# Patient Record
Sex: FEMALE | Race: WHITE | NOT HISPANIC OR LATINO | URBAN - METROPOLITAN AREA
[De-identification: names, ages, dates, MRNs, and addresses within clinical notes are randomized per-mention and may not be internally consistent; named-entity substitution may affect disease eponyms.]

---

## 2017-01-12 ENCOUNTER — HOSPITAL ENCOUNTER (OUTPATIENT)
Dept: MAMMOGRAPHY | Facility: MEDICAL CENTER | Age: 57
Discharge: HOME | End: 2017-01-12
Payer: COMMERCIAL

## 2017-01-12 DIAGNOSIS — Z12.31 ENCOUNTER FOR SCREENING MAMMOGRAM FOR MALIGNANT NEOPLASM OF BREAST: ICD-10-CM

## 2017-01-12 PROCEDURE — 77063 BREAST TOMOSYNTHESIS BI: CPT

## 2017-01-12 PROCEDURE — G0202 SCR MAMMO BI INCL CAD: HCPCS

## 2017-10-12 ENCOUNTER — ALLSCRIPTS OFFICE VISIT (OUTPATIENT)
Dept: OTHER | Facility: OTHER | Age: 57
End: 2017-10-12

## 2017-10-12 DIAGNOSIS — Z12.31 ENCOUNTER FOR SCREENING MAMMOGRAM FOR MALIGNANT NEOPLASM OF BREAST: ICD-10-CM

## 2018-01-15 NOTE — CONSULTS
Chief Complaint  The patient presents to the office for her routine exam       History of Present Illness  HPI: here for yearly gyn exam; beginning to skip menses; none since March 2017 and having periodic hot flashes, not requiring medication   GYN HM, Adult Female [facility]: The patient is being seen for a health maintenance evaluation  The last health maintenance visit was 12 month(s) ago  General Health: The patient's health since the last visit is described as good  She has regular dental visits  She denies vision problems  She denies hearing loss  Immunizations status: up to date  Lifestyle:  She consumes a diverse and healthy diet  She does not have any weight concerns  She exercises regularly  She does not use tobacco  She denies alcohol use  She denies drug use  Reproductive health:  she reports no menstrual problems  Screening: cancer screening reviewed and current  metabolic screening reviewed and current  risk screening reviewed and current  Review of Systems    Constitutional: No fever, no chills, feels well, no tiredness, no recent weight gain or loss  ENT: no ear ache, no loss of hearing, no nosebleeds or nasal discharge, no sore throat or hoarseness  Cardiovascular: no complaints of slow or fast heart rate, no chest pain, no palpitations, no leg claudication or lower extremity edema  Respiratory: no complaints of shortness of breath, no wheezing, no dyspnea on exertion, no orthopnea or PND  Breasts: no complaints of breast pain, breast lump or nipple discharge  Gastrointestinal: no complaints of abdominal pain, no constipation, no nausea or diarrhea, no vomiting, no bloody stools  Genitourinary: no complaints of dysuria, no incontinence, no pelvic pain, no dysmenorrhea, no vaginal discharge or abnormal vaginal bleeding  Musculoskeletal: no complaints of arthralgia, no myalgia, no joint swelling or stiffness, no limb pain or swelling     Integumentary: no complaints of skin rash or lesion, no itching or dry skin, no skin wounds  Neurological: no complaints of headache, no confusion, no numbness or tingling, no dizziness or fainting  Active Problems    1  Anemia (285 9) (D64 9)   2  Dense breasts (793 82) (R92 2)   3  Encounter for routine gynecological examination (V72 31)   4  Encounter for routine gynecological examination with Papanicolaou smear of cervix   (V72 31,V76 2) (Z01 419)   5  Encounter for screening mammogram for malignant neoplasm of breast (V76 12)   (Z12 31)   6  Irregular menstrual cycle (626 4) (N92 6)   7  Mammogram abnormal (793 80) (R92 8)   8  Menopausal disorder (627 9) (N95 9)   9  Madie-menopause (627 2) (N95 1)   10  Screening for HPV (human papillomavirus) (V73 81) (Z11 51)    Surgical History    · History of Complete Colonoscopy   · History of Tubal Ligation    Family History    · Family history of colon cancer (V16 0) (Z80 0)    Social History    · Being A Social Drinker   · Never A Smoker    Current Meds   1  Benazepril HCl - 10 MG Oral Tablet; Therapy: [id number] to (Last Rx:[id number])  Requested for: 61SAE4456 Ordered   2  ZyrTEC Allergy TABS Recorded    Allergies    1  Amoxicillin CAPS   2  Ampicillin CAPS   3  Levaquin TABS   4  Penicillins   5  Sulfa Antibiotics    Vitals   Recorded: 86ADC2139 14:25ET   Systolic 209   Diastolic 74   Height 5 ft 1 in   Weight 128 lb    BMI Calculated 24 19   BSA Calculated 1 56   LMP [id number]     Physical Exam    Constitutional   General appearance: No acute distress, well appearing and well nourished  Neck   Neck: Normal, supple, trachea midline, no masses  Thyroid: Normal, no thyromegaly  Pulmonary   Respiratory effort: No increased work of breathing or signs of respiratory distress  Auscultation of lungs: Clear to auscultation  Cardiovascular   Auscultation of heart: Normal rate and rhythm, normal S1 and S2, no murmurs  Peripheral vascular exam: Normal pulses Throughout  Genitourinary   External genitalia: Normal and no lesions appreciated  Vagina: Normal, no lesions or dryness appreciated  Urethra: Normal     Urethral meatus: Normal     Bladder: Normal, soft, non-tender and no prolapse or masses appreciated  Cervix: Normal, no palpable masses  Uterus: Normal, non-tender, not enlarged, and no palpable masses  Adnexa/parametria: Normal, non-tender and no fullness or masses appreciated  Anus, perineum, and rectum: Normal sphincter tone, no masses, and no prolapse  Chest   Breasts: Normal and no dimpling or skin changes noted  densie bilaterally  Abdomen   Abdomen: Normal, non-tender, and no organomegaly noted  Liver and spleen: No hepatomegaly or splenomegaly  Examination for hernias: No hernias appreciated  Stool sample for occult blood: Negative  Lymphatic   Palpation of lymph nodes in neck, axillae, groin and/or other locations: No lymphadenopathy or masses noted  Skin   Skin and subcutaneous tissue: Normal skin turgor and no rashes  Palpation of skin and subcutaneous tissue: Normal     Psychiatric   Orientation to person, place, and time: Normal     Mood and affect: Normal        Assessment    1  Dense breasts (793 82) (R92 2)   2  Madie-menopause (627 2) (N95 1)   3  Encounter for routine gynecological examination (V72 31)    Plan  Encounter for routine gynecological examination    · Follow-up visit in 1 year Evaluation and Treatment  Follow-up  Status: Hold For -  Scheduling  Requested for: 77GLD6288   Ordered; For: Encounter for routine gynecological examination; Ordered By: Fay Avila Performed:  Due: 62JNE0202  Encounter for screening mammogram for malignant neoplasm of breast    · * MAMMO SCREENING BILATERAL W CAD; Status:Hold For - Scheduling,Retrospective  By Protocol Authorization; Requested for:[id number];    Perform:[facility]; [id number];  Last Updated By:Evan Valle; 10/12/2017 11:42:13 AM;Ordered; For:Encounter for screening mammogram for malignant neoplasm of breast; Ordered By:Shayla Chatman;    Discussion/Summary    Reviewed potential gyn emergencies Rx given for next 3-D screening mammogram; RTO 1 year or prn; if meds needed for hot flashes, to call; seen with p yariel  student, Dutch Solomon        Signatures   Electronically signed by : Cynthia Wihte DO; Oct 12 2017 12:36PM EST                       (Author)

## 2018-01-15 NOTE — CONSULTS
Chief Complaint  The patient presents for her routine exam       History of Present Illness  HPI: here for yearl, some hot flashes; last men ses March, 2017y preventive       GYN HM, Adult Female [facility]: The patient is being seen for a health maintenance evaluation  The last health maintenance visit was 12 month(s) ago  General Health: The patient's health since the last visit is described as good  She has regular dental visits  She denies vision problems  She denies hearing loss  Immunizations status: up to date  Lifestyle:  She consumes a diverse and healthy diet  She does not have any weight concerns  She exercises regularly  She does not use tobacco  She denies alcohol use  She denies drug use  Reproductive health:  she reports no menstrual problems  Screening: cancer screening reviewed and current  metabolic screening reviewed and current  risk screening reviewed and current  Review of Systems    Constitutional: No fever, no chills, feels well, no tiredness, no recent weight gain or loss  ENT: no ear ache, no loss of hearing, no nosebleeds or nasal discharge, no sore throat or hoarseness  Cardiovascular: no complaints of slow or fast heart rate, no chest pain, no palpitations, no leg claudication or lower extremity edema  Respiratory: no complaints of shortness of breath, no wheezing, no dyspnea on exertion, no orthopnea or PND  Breasts: no complaints of breast pain, breast lump or nipple discharge  Gastrointestinal: no complaints of abdominal pain, no constipation, no nausea or diarrhea, no vomiting, no bloody stools  Genitourinary: as noted in HPI  Musculoskeletal: no complaints of arthralgia, no myalgia, no joint swelling or stiffness, no limb pain or swelling  Integumentary: no complaints of skin rash or lesion, no itching or dry skin, no skin wounds  Neurological: no complaints of headache, no confusion, no numbness or tingling, no dizziness or fainting        Active Problems    1  Anemia (285 9) (D64 9)   2  Dense breasts (793 82) (R92 2)   3  Encounter for routine gynecological examination (V72 31)   4  Encounter for routine gynecological examination with Papanicolaou smear of cervix   (V72 31,V76 2) (Z01 419)   5  Encounter for screening mammogram for malignant neoplasm of breast (V76 12)   (Z12 31)   6  Irregular menstrual cycle (626 4) (N92 6)   7  Mammogram abnormal (793 80) (R92 8)   8  Menopausal disorder (627 9) (N95 9)   9  Madie-menopause (627 2) (N95 1)   10  Screening for HPV (human papillomavirus) (V73 81) (Z11 51)    Surgical History    · History of Complete Colonoscopy   · History of Tubal Ligation    Family History    · Family history of colon cancer (V16 0) (Z80 0)    Social History    · Being A Social Drinker   · Never A Smoker    Current Meds   1  Benazepril HCl - 10 MG Oral Tablet; Therapy: 08GLI6610 to (Last Rx:59Vmj2079)  Requested for: 38PKQ1775 Ordered   2  ZyrTEC Allergy TABS Recorded    Allergies    1  Amoxicillin CAPS   2  Ampicillin CAPS   3  Levaquin TABS   4  Penicillins   5  Sulfa Antibiotics    Vitals   Recorded: 04VOY9533 71:29NM   Systolic 282   Diastolic 74   Height 5 ft 1 in   Weight 128 lb    BMI Calculated 24 19   BSA Calculated 1 56   LMP [id number]     Physical Exam    Constitutional   General appearance: No acute distress, well appearing and well nourished  Neck   Neck: Normal, supple, trachea midline, no masses  Thyroid: Normal, no thyromegaly  Pulmonary   Respiratory effort: No increased work of breathing or signs of respiratory distress  Auscultation of lungs: Clear to auscultation  Cardiovascular   Auscultation of heart: Normal rate and rhythm, normal S1 and S2, no murmurs  Peripheral vascular exam: Normal pulses Throughout  Genitourinary   External genitalia: Normal and no lesions appreciated  Vagina: Normal, no lesions or dryness appreciated      Urethra: Normal     Urethral meatus: Normal     Bladder: Normal, soft, non-tender and no prolapse or masses appreciated  Cervix: Normal, no palpable masses  Uterus: Normal, non-tender, not enlarged, and no palpable masses  Adnexa/parametria: Normal, non-tender and no fullness or masses appreciated  Anus, perineum, and rectum: Normal sphincter tone, no masses, and no prolapse  Chest   Breasts: Normal and no dimpling or skin changes noted  Abdomen   Abdomen: Normal, non-tender, and no organomegaly noted  Liver and spleen: No hepatomegaly or splenomegaly  Examination for hernias: No hernias appreciated  Stool sample for occult blood: Negative  Lymphatic   Palpation of lymph nodes in neck, axillae, groin and/or other locations: No lymphadenopathy or masses noted  Skin   Skin and subcutaneous tissue: Normal skin turgor and no rashes  Palpation of skin and subcutaneous tissue: Normal     Psychiatric   Orientation to person, place, and time: Normal     Mood and affect: Normal        Assessment    1  Dense breasts (793 82) (R92 2)   2  Madie-menopause (627 2) (N95 1)   3  Encounter for routine gynecological examination (V72 31)    Plan  Encounter for routine gynecological examination    · Follow-up visit in 1 year Evaluation and Treatment  Follow-up  Status: Hold For -  Scheduling  Requested for: 30IMD4240   Ordered; For: Encounter for routine gynecological examination; Ordered By: Silva Mendoza Performed:  Due: 26PKZ5234  Encounter for screening mammogram for malignant neoplasm of breast    · * MAMMO SCREENING BILATERAL W CAD; Status:Hold For - Scheduling,Retrospective  By Protocol Authorization; Requested for:12Oct2017;    Perform:[facility]; [id number]; Last Updated By:Mariah Valle; 10/12/2017 11:42:13 AM;Ordered;  For:Encounter for screening mammogram for malignant neoplasm of breast; Ordered By:Camilo Chatman;    Discussion/Summary    Reviewed potential gyn emergencies;  Rx for next screening mammogram; if meds, needed, to call; not due for pap and HPV until 2019        Future Appointments    Signatures   Electronically signed by : Collette Life, DO; Oct 12 2017 12:49PM EST                       (Author)

## 2018-01-22 ENCOUNTER — HOSPITAL ENCOUNTER (OUTPATIENT)
Dept: MAMMOGRAPHY | Facility: MEDICAL CENTER | Age: 58
Discharge: HOME | End: 2018-01-22
Payer: COMMERCIAL

## 2018-01-22 VITALS
SYSTOLIC BLOOD PRESSURE: 122 MMHG | WEIGHT: 128 LBS | HEIGHT: 61 IN | DIASTOLIC BLOOD PRESSURE: 74 MMHG | BODY MASS INDEX: 24.17 KG/M2

## 2018-01-22 DIAGNOSIS — Z12.31 ENCOUNTER FOR SCREENING MAMMOGRAM FOR MALIGNANT NEOPLASM OF BREAST: ICD-10-CM

## 2018-01-22 PROCEDURE — 77067 SCR MAMMO BI INCL CAD: CPT

## 2018-01-22 PROCEDURE — 77063 BREAST TOMOSYNTHESIS BI: CPT

## 2018-10-24 ENCOUNTER — ANNUAL EXAM (OUTPATIENT)
Dept: OBGYN CLINIC | Facility: MEDICAL CENTER | Age: 58
End: 2018-10-24
Payer: COMMERCIAL

## 2018-10-24 VITALS
HEIGHT: 62 IN | DIASTOLIC BLOOD PRESSURE: 80 MMHG | SYSTOLIC BLOOD PRESSURE: 140 MMHG | BODY MASS INDEX: 23.26 KG/M2 | WEIGHT: 126.4 LBS

## 2018-10-24 DIAGNOSIS — Z01.419 ENCNTR FOR GYN EXAM (GENERAL) (ROUTINE) W/O ABN FINDINGS: Primary | ICD-10-CM

## 2018-10-24 DIAGNOSIS — Z12.11 ENCOUNTER FOR SCREENING COLONOSCOPY: ICD-10-CM

## 2018-10-24 DIAGNOSIS — Z80.0 FAMILY HISTORY OF COLON CANCER IN FATHER: ICD-10-CM

## 2018-10-24 DIAGNOSIS — Z12.31 ENCOUNTER FOR SCREENING MAMMOGRAM FOR MALIGNANT NEOPLASM OF BREAST: ICD-10-CM

## 2018-10-24 PROCEDURE — S0612 ANNUAL GYNECOLOGICAL EXAMINA: HCPCS | Performed by: OBSTETRICS & GYNECOLOGY

## 2018-10-24 RX ORDER — CETIRIZINE HYDROCHLORIDE 10 MG/1
TABLET ORAL
COMMUNITY

## 2018-10-24 RX ORDER — BENAZEPRIL HYDROCHLORIDE 10 MG/1
TABLET ORAL
COMMUNITY
Start: 2011-05-16 | End: 2020-09-24

## 2018-10-24 RX ORDER — OMEGA-3-ACID ETHYL ESTERS 1 G/1
2 CAPSULE, LIQUID FILLED ORAL 2 TIMES DAILY
COMMUNITY

## 2018-10-24 RX ORDER — MELATONIN
1000 DAILY
COMMUNITY

## 2018-10-24 NOTE — PROGRESS NOTES
ASSESSMENT & PLAN: Hay Gann was seen today for gynecologic exam     Diagnoses and all orders for this visit:    Encntr for gyn exam (general) (routine) w/o abn findings    Encounter for screening mammogram for malignant neoplasm of breast  -     Mammo screening bilateral w 3d & cad; Future    Encounter for screening colonoscopy  -     Ambulatory referral to Gastroenterology; Future    Family history of colon cancer in father  -     Ambulatory referral to Gastroenterology; Future    Discussion/Summary:   Pt  Here for yearly gyn preventive;msincelast visit, pt  Has had no menses since late 2017   1  Routine well woman exam done today  2  Pap and HPV:  The patient's pap is up to date  Pap and cotesting was not done today  Current ASCCP Guidelines reviewed  3   Mammogram was ordered  4  Colonoscopy is not up to date  4  The following were reviewed in today's visit: colonoscopy discussed and ordered  5  F/u 1 year      CC:  Annual Gynecologic Examination    HPI: Rome Bosworth is a 60 y o  who presents for annual gynecologic examination  She has the following concerns:  none    Health Maintenance:    Patient describes her health as good  Patient does not have weight concerns  She exercises 7 days per week with work and walking  She doeswears her seatbelt routinely  She does perform regular monthly self breast exams  She does feel safe at home  Patients does not follow a  diet  Patient gets 4 servings of dairy or calcium rich foods a day  Last pap: 2014  Last mammogram: 2016  Last colonoscopy: 2014    Patient Active Problem List   Diagnosis    Dense breasts    Menopausal disorder    Madie-menopause       No past medical history on file  No past surgical history on file  Past OB/Gyn History:    History of abnormal pap smears: No    Patient is currently sexually active    Patient's family planning method is post menopausal status  No family history on file      Social History:  Social History     Social History    Marital status: /Civil Union     Spouse name: N/A    Number of children: N/A    Years of education: N/A     Occupational History    Not on file  Social History Main Topics    Smoking status: Never Smoker    Smokeless tobacco: Never Used    Alcohol use Yes      Comment: occ   Drug use: No    Sexual activity: Yes     Partners: Male     Birth control/ protection: Female Sterilization     Other Topics Concern    Not on file     Social History Narrative    No narrative on file     Presently lives with family  Patient is currently employed   Allergies   Allergen Reactions    Amoxicillin     Ampicillin     Levofloxacin     Penicillins     Sulfa Antibiotics          Current Outpatient Prescriptions:     b complex vitamins tablet, Take 1 tablet by mouth daily, Disp: , Rfl:     benazepril (LOTENSIN) 10 mg tablet, Take by mouth, Disp: , Rfl:     cetirizine (ZYRTEC ALLERGY) 10 mg tablet, Take by mouth, Disp: , Rfl:     cholecalciferol (VITAMIN D3) 1,000 units tablet, Take 1,000 Units by mouth daily, Disp: , Rfl:     omega-3-acid ethyl esters (LOVAZA) 1 g capsule, Take 2 g by mouth 2 (two) times a day, Disp: , Rfl:     Review of Systems  Constitutional :no fever, feels well, no tiredness, no recent weight gain or loss  ENT: no ear ache, no loss of hearing, no nosebleeds or nasal discharge, no sore throat or hoarseness  Cardiovascular: no complaints of slow or fast heart beat, no chest pain, no palpitations, no leg claudication or lower extremity edema    Respiratory: no complaints of shortness of shortness of breath, no JONES  Breasts:no complaints of breast pain, breast lump, or nipple discharge  Gastrointestinal: no complaints of abdominal pain, constipation, nausea, vomiting, or diarrhea or bloody stools  Genitourinary : no complaints of dysuria, incontinence, pelvic pain, no dysmenorrhea, vaginal discharge or abnormal vaginal bleeding and as noted in HPI   Musculoskeletal: no complaints of arthralgia, no myalgia, no joint swelling or stiffness, no limb pain or swelling  Integumentary: no complaints of skin rash or lesion, itching or dry skin  Neurological: no complaints of headache, no confusion, no numbness or tingling, no dizziness or fainting    Physical Exam:     /80   Ht 5' 1 5" (1 562 m)   Wt 57 3 kg (126 lb 6 4 oz)   Breastfeeding? No   BMI 23 50 kg/m²     General: appears stated age, cooperative, alert normal mood and affect   Psychiatric oriented to person, place and time  Mood and affect normal   Neck: normal, supple,trachea midline, no masses  Thyroid: normal, no thyromegaly   Heart: regular rate and rhythm, S1, S2 normal, no murmur, click, rub or gallop   Lungs: clear to auscultation bilaterally, no increased work of breathing or signs of respiratory distress   Breasts: normal, no dimpling or skin changes noted   Abdomen: soft, non-tender, without masses or organomegaly   Vulva: normal , no lesions   Vagina: normal , no lesions or dryness   Urethra: normal   Urethal meatus normal   Bladder Normal, soft, non-tender and no prolapse or masses appreciated   Cervix: normal, no palpable masses    Uterus: normal , non-tender, not enlarged, no palpable masses   Adnexa: normal, non-tender without fullness or masses; hemoccult neg  Lymphatic Palpation of lymph nodes in neck, axilla, groin and/or other locations: no lymphadenopathy or masses noted   Skin Normal skin turgor and no rashes    Palpation of skin and subcutaneous tissue normal

## 2018-10-24 NOTE — PATIENT INSTRUCTIONS
Pt  Seen with eva Gardner a  Student, Dutch; pt   Had worked for [employer] for 44 years, now with [employer]

## 2019-01-23 ENCOUNTER — HOSPITAL ENCOUNTER (OUTPATIENT)
Dept: MAMMOGRAPHY | Facility: MEDICAL CENTER | Age: 59
Discharge: HOME | End: 2019-01-23
Payer: COMMERCIAL

## 2019-01-23 VITALS — BODY MASS INDEX: 23.19 KG/M2 | HEIGHT: 62 IN | WEIGHT: 126 LBS

## 2019-01-23 DIAGNOSIS — Z12.31 ENCOUNTER FOR SCREENING MAMMOGRAM FOR MALIGNANT NEOPLASM OF BREAST: ICD-10-CM

## 2019-01-23 PROCEDURE — 77063 BREAST TOMOSYNTHESIS BI: CPT

## 2019-01-23 PROCEDURE — 77067 SCR MAMMO BI INCL CAD: CPT

## 2019-03-28 ENCOUNTER — TELEPHONE (OUTPATIENT)
Dept: GASTROENTEROLOGY | Facility: CLINIC | Age: 59
End: 2019-03-28

## 2020-01-02 ENCOUNTER — TRANSCRIBE ORDERS (OUTPATIENT)
Dept: ADMINISTRATIVE | Facility: HOSPITAL | Age: 60
End: 2020-01-02

## 2020-01-02 ENCOUNTER — TELEPHONE (OUTPATIENT)
Dept: OBGYN CLINIC | Facility: MEDICAL CENTER | Age: 60
End: 2020-01-02

## 2020-01-02 DIAGNOSIS — Z12.31 ENCOUNTER FOR SCREENING MAMMOGRAM FOR MALIGNANT NEOPLASM OF BREAST: Primary | ICD-10-CM

## 2020-02-21 ENCOUNTER — HOSPITAL ENCOUNTER (OUTPATIENT)
Dept: MAMMOGRAPHY | Facility: MEDICAL CENTER | Age: 60
Discharge: HOME | End: 2020-02-21
Payer: COMMERCIAL

## 2020-02-21 VITALS — BODY MASS INDEX: 23.19 KG/M2 | WEIGHT: 126 LBS | HEIGHT: 62 IN

## 2020-02-21 DIAGNOSIS — Z12.31 ENCOUNTER FOR SCREENING MAMMOGRAM FOR MALIGNANT NEOPLASM OF BREAST: ICD-10-CM

## 2020-02-21 PROCEDURE — 77067 SCR MAMMO BI INCL CAD: CPT

## 2020-02-21 PROCEDURE — 77063 BREAST TOMOSYNTHESIS BI: CPT

## 2020-09-08 ENCOUNTER — OFFICE VISIT (OUTPATIENT)
Dept: OBGYN CLINIC | Facility: MEDICAL CENTER | Age: 60
End: 2020-09-08
Payer: COMMERCIAL

## 2020-09-08 VITALS
DIASTOLIC BLOOD PRESSURE: 70 MMHG | SYSTOLIC BLOOD PRESSURE: 132 MMHG | TEMPERATURE: 98.1 F | BODY MASS INDEX: 23.03 KG/M2 | WEIGHT: 123.9 LBS

## 2020-09-08 DIAGNOSIS — R10.2 PELVIC PAIN: ICD-10-CM

## 2020-09-08 DIAGNOSIS — R10.2 SUPRAPUBIC PAIN: Primary | ICD-10-CM

## 2020-09-08 LAB
SL AMB  POCT GLUCOSE, UA: NORMAL
SL AMB LEUKOCYTE ESTERASE,UA: NORMAL
SL AMB POCT BILIRUBIN,UA: NORMAL
SL AMB POCT BLOOD,UA: NORMAL
SL AMB POCT CLARITY,UA: CLEAR
SL AMB POCT COLOR,UA: NORMAL
SL AMB POCT KETONES,UA: NORMAL
SL AMB POCT NITRITE,UA: NORMAL
SL AMB POCT PH,UA: 5
SL AMB POCT SPECIFIC GRAVITY,UA: 1.01
SL AMB POCT URINE PROTEIN: NORMAL
SL AMB POCT UROBILINOGEN: NORMAL

## 2020-09-08 PROCEDURE — 81002 URINALYSIS NONAUTO W/O SCOPE: CPT | Performed by: STUDENT IN AN ORGANIZED HEALTH CARE EDUCATION/TRAINING PROGRAM

## 2020-09-08 PROCEDURE — 99213 OFFICE O/P EST LOW 20 MIN: CPT | Performed by: STUDENT IN AN ORGANIZED HEALTH CARE EDUCATION/TRAINING PROGRAM

## 2020-09-08 NOTE — PROGRESS NOTES
[facility] [address] [place] [address]    Assessment/Plan:  Suprapubic pain  - Normal UA    Pelvic pain  - We discussed the possible etiologies of her pelvic pain including GI causes, musculoskeletal causes, ovarian causes such as simple or complex ovarian cyst, intermittent ovarian torsion, or GYN malignancy  - Recommended starting with Pelvic Ultrasound  - For pain, we discussed that it is safe to increase her ibuprofen use to 600 mg q 6 hours for the next 7 days, as long as she doesn't have any underlying kidney disease or peptic ulcer disease;     Diagnoses and all orders for this visit:    Suprapubic pain  -     Cancel: POCT urine dip  -     US pelvis complete non OB; Future    Pelvic pain  -     US pelvis complete non OB; Future  -     POCT urine dip          Subjective:   Phuc Fitzpatrick is a 63 y o   female  CC: Pelvic pain    HPI: Milana Dominguez presents with 4 days of acute onset left sided pelvic pain  It came on rather suddenly  The quality is throbbing  It is moderate in intensity, but not severe  It comes and goes  It is relieved by heating pad and ibuprofen  It is worsened by activity  It is not associated with nausea or vomiting  ROS: Review of Systems   Constitutional: Negative for appetite change, fatigue, fever and unexpected weight change  HENT: Negative for congestion and rhinorrhea  Eyes: Negative for visual disturbance  Respiratory: Negative for cough and shortness of breath  Cardiovascular: Negative for chest pain and palpitations  Gastrointestinal: Negative for abdominal distention, abdominal pain, blood in stool, constipation, diarrhea, nausea and vomiting  Endocrine: Negative for cold intolerance and heat intolerance  Genitourinary: Positive for pelvic pain  Negative for dysuria, flank pain, hematuria, urgency, vaginal bleeding and vaginal discharge  Musculoskeletal: Negative for back pain and myalgias     Skin: Negative for rash  Neurological: Negative for weakness and headaches  Hematological: Negative for adenopathy  Does not bruise/bleed easily  Psychiatric/Behavioral: Negative for dysphoric mood and suicidal ideas  The patient is not nervous/anxious  PFSH: The following portions of the patient's history were reviewed and updated as appropriate: allergies, current medications, past family history, past medical history, obstetric history, gynecologic history, past social history, past surgical history and problem list        Objective:  /70 (BP Location: Left arm, Patient Position: Sitting, Cuff Size: Standard)   Temp 98 1 °F (36 7 °C) (Temporal)   Wt 56 2 kg (123 lb 14 4 oz)   BMI 23 03 kg/m²    Physical Exam  Constitutional:       Appearance: Normal appearance  HENT:      Head: Normocephalic and atraumatic  Cardiovascular:      Rate and Rhythm: Normal rate  Pulmonary:      Effort: Pulmonary effort is normal    Abdominal:      General: There is no distension  Tenderness: There is no abdominal tenderness  There is no guarding  Genitourinary:     Exam position: Lithotomy position  Pubic Area: No rash  Labia:         Right: No rash, tenderness or lesion  Left: No rash, tenderness or lesion  Urethra: No prolapse, urethral swelling or urethral lesion  Vagina: No vaginal discharge, erythema, tenderness, bleeding or lesions  Cervix: No cervical motion tenderness, discharge, friability or erythema  Uterus: Not enlarged, not tender and no uterine prolapse  Adnexa:         Right: No mass, tenderness or fullness  Left: Fullness present  No mass or tenderness  Lymphadenopathy:      Lower Body: No right inguinal adenopathy  No left inguinal adenopathy  Neurological:      Mental Status: She is alert             Darnell Navarro MD  [address] [place] [address]  9/8/2020 4:26 PM

## 2020-09-08 NOTE — ASSESSMENT & PLAN NOTE
- We discussed the possible etiologies of her pelvic pain including GI causes, musculoskeletal causes, ovarian causes such as simple or complex ovarian cyst, intermittent ovarian torsion, or GYN malignancy  - Recommended starting with Pelvic Ultrasound     - For pain, we discussed that it is safe to increase her ibuprofen use to 600 mg q 6 hours for the next 7 days, as long as she doesn't have any underlying kidney disease or peptic ulcer disease;

## 2020-09-09 ENCOUNTER — HOSPITAL ENCOUNTER (OUTPATIENT)
Dept: ULTRASOUND IMAGING | Facility: MEDICAL CENTER | Age: 60
Discharge: HOME | End: 2020-09-09
Payer: COMMERCIAL

## 2020-09-09 DIAGNOSIS — R10.2 PELVIC PAIN: ICD-10-CM

## 2020-09-09 DIAGNOSIS — R10.2 SUPRAPUBIC PAIN: ICD-10-CM

## 2020-09-09 PROCEDURE — 76856 US EXAM PELVIC COMPLETE: CPT

## 2020-09-09 PROCEDURE — 76830 TRANSVAGINAL US NON-OB: CPT

## 2020-09-14 ENCOUNTER — TELEPHONE (OUTPATIENT)
Dept: OBGYN CLINIC | Facility: MEDICAL CENTER | Age: 60
End: 2020-09-14

## 2020-09-14 DIAGNOSIS — R09.89 SUSPECTED DEEP VEIN THROMBOSIS: Primary | ICD-10-CM

## 2020-09-14 DIAGNOSIS — R10.2 PELVIC PAIN: ICD-10-CM

## 2020-09-14 NOTE — TELEPHONE ENCOUNTER
TELEPHONE CALL      I called Darin Ricketts to discus the findings of her pelvic ultrasound (ordered for the indication of left adnexal pain)  I spoke to the radiologist (Dr Vega Bella) who indicated that the left ovarian vein appears dilated  Clinically, pelvic congestion syndrome seems rare in a postmenopausal patient  He suggested the next line of testing would be to further characterise the dilation and look for VTE  I discussed these findings with Darin Ricketts and she would like to proceed with the CT A/P with contrast   - Study ordered  - Prior authorization request sent    I told her to call our office in a few day to confirm the prior authorization      Yusuf Whitney MD  [address] [place] [address]  9/14/2020 5:27 PM

## 2020-09-18 ENCOUNTER — TELEPHONE (OUTPATIENT)
Dept: OBGYN CLINIC | Facility: MEDICAL CENTER | Age: 60
End: 2020-09-18

## 2020-09-18 NOTE — TELEPHONE ENCOUNTER
----- Message from Nadeen Chandra sent at 9/18/2020  2:23 PM EDT -----  I initiated PA for the CT scan through [facility]  Additional info is needed  I faxed clinicals to [phone]     Tracking# is [id number]    ----- Message -----  From: Nadeen Chandra  Sent: 9/18/2020   2:05 PM EDT  To: Eusebio Spear is scheduled for 9/24

## 2020-09-22 ENCOUNTER — TELEPHONE (OUTPATIENT)
Dept: OBGYN CLINIC | Facility: MEDICAL CENTER | Age: 60
End: 2020-09-22

## 2020-09-22 NOTE — TELEPHONE ENCOUNTER
Ct scan denied because they did not receive clinicals notes but I did faxed that to them on 9/18/20  Clinicals refaxed and marked as urgent

## 2020-09-24 ENCOUNTER — ANNUAL EXAM (OUTPATIENT)
Dept: OBGYN CLINIC | Facility: MEDICAL CENTER | Age: 60
End: 2020-09-24
Payer: COMMERCIAL

## 2020-09-24 ENCOUNTER — TELEPHONE (OUTPATIENT)
Dept: OBGYN CLINIC | Facility: MEDICAL CENTER | Age: 60
End: 2020-09-24

## 2020-09-24 VITALS
BODY MASS INDEX: 23.06 KG/M2 | TEMPERATURE: 97.3 F | HEIGHT: 62 IN | SYSTOLIC BLOOD PRESSURE: 118 MMHG | WEIGHT: 125.3 LBS | DIASTOLIC BLOOD PRESSURE: 68 MMHG

## 2020-09-24 DIAGNOSIS — Z11.51 SCREENING FOR HUMAN PAPILLOMAVIRUS (HPV): ICD-10-CM

## 2020-09-24 DIAGNOSIS — Z01.419 ENCOUNTER FOR ROUTINE GYNECOLOGICAL EXAMINATION WITH PAPANICOLAOU SMEAR OF CERVIX: Primary | ICD-10-CM

## 2020-09-24 DIAGNOSIS — Z12.31 ENCOUNTER FOR SCREENING MAMMOGRAM FOR MALIGNANT NEOPLASM OF BREAST: ICD-10-CM

## 2020-09-24 PROCEDURE — 99396 PREV VISIT EST AGE 40-64: CPT | Performed by: OBSTETRICS & GYNECOLOGY

## 2020-09-24 RX ORDER — BENAZEPRIL HYDROCHLORIDE 20 MG/1
20 TABLET ORAL DAILY
COMMUNITY
Start: 2020-07-31

## 2020-09-24 RX ORDER — ASCORBIC ACID 500 MG
TABLET ORAL
COMMUNITY

## 2020-09-24 RX ORDER — ROSUVASTATIN CALCIUM 5 MG/1
TABLET, COATED ORAL
COMMUNITY

## 2020-09-24 NOTE — PROGRESS NOTES
ASSESSMENT & PLAN: Mauro rios was seen today for gynecologic exam     Diagnoses and all orders for this visit:    Encounter for routine gynecological examination with Papanicolaou smear of cervix  -     Thinprep Tis Pap and HPV mRNA E6/E7 Reflex HPV 16,18/45    Screening for human papillomavirus (HPV)  -     Thinprep Tis Pap and HPV mRNA E6/E7 Reflex HPV 16,18/45    Encounter for screening mammogram for malignant neoplasm of breast  -     Mammo screening bilateral w 3d & cad; Future    Discussion/Summary:  Patient here for yearly gyn preventive exam; recently here for LLQ pelvic pain; reviewed recent pelvic u s  suggestive of pelvic congeswtion, but patient admits to excess stress due to losing job during pandemic and she admit to eating large amounts of nuts; thus, suggestive of diverticulitis; advised to change diet of nts and/or small seeds, heating pad to area, and id ibuprofen does not help, change to acetaminophen  Note, pain has been intermittent and has not affected her sleep  She will monitor the pain after dietary changes and if conditions becomes more severe, will call  1   Routine well woman exam done today  2  Pap and HPV:  The patient's pap is not up to date  Pap and cotesting was done today  Current ASCCP Guidelines reviewed  3   Mammogram was ordered  4  Colorectal cancer screening is up to date  4  The following were reviewed in today's visit: breast self exam, adequate intake of calcium and vitamin D, exercise and healthy diet  5  F/u 1 year  CC:  Annual Gynecologic Examination    HPI: Reema Tesfaye is a 63 y o  who presents for annual gynecologic examination  She has the following concerns:  See above discussion  Health Maintenance:    Patient describes her health as good  Patient does not have weight concerns  She exercises 7 days per week with walking  She does wear her seatbelt routinely  She does perform regular monthly self breast exams      She does feel safe at home    Patients does not follow a  diet  Patient gets 4 servings of dairy or calcium rich foods a day  Last pap: 2014  Last mammogram: 2018  Last colorectal cancer screenin    Patient Active Problem List   Diagnosis    Dense breasts    Menopausal disorder    Madie-menopause    Pelvic pain    Suprapubic pain       No past medical history on file  No past surgical history on file  Past OB/Gyn History:    History of abnormal pap smears: No    Patient is currently sexually active  Monogamous Patient's family planning method is post menopausal status  Family History   Problem Relation Age of Onset    No Known Problems Mother     Colon cancer Father 79    No Known Problems Sister     No Known Problems Daughter     Breast cancer Maternal Grandmother 80    No Known Problems Maternal Grandfather     No Known Problems Paternal Grandmother     No Known Problems Paternal Grandfather     No Known Problems Maternal Aunt     No Known Problems Maternal Aunt     No Known Problems Paternal Aunt        Social History:  Social History     Socioeconomic History    Marital status: /Civil Union     Spouse name: Not on file    Number of children: Not on file    Years of education: Not on file    Highest education level: Not on file   Occupational History    Not on file   Social Needs    Financial resource strain: Not on file    Food insecurity     Worry: Not on file     Inability: Not on file   [employer] needs     Medical: Not on file     Non-medical: Not on file   Tobacco Use    Smoking status: Never Smoker    Smokeless tobacco: Never Used   Substance and Sexual Activity    Alcohol use: Yes     Comment: occ      Drug use: No    Sexual activity: Yes     Partners: Male     Birth control/protection: Female Sterilization   Lifestyle    Physical activity     Days per week: Not on file     Minutes per session: Not on file    Stress: Not on file   Relationships    Social connections     Talks on phone: Not on file     Gets together: Not on file     Attends Judaism service: Not on file     Active member of club or organization: Not on file     Attends meetings of clubs or organizations: Not on file     Relationship status: Not on file    Intimate partner violence     Fear of current or ex partner: Not on file     Emotionally abused: Not on file     Physically abused: Not on file     Forced sexual activity: Not on file   Other Topics Concern    Not on file   Social History Narrative    Not on file     Presently lives with family  Patient is currently unemployed   Allergies   Allergen Reactions    Amoxicillin     Ampicillin     Bee Venom      Hives, swelling      Doxycycline      rash, hives      Levofloxacin     Penicillins     Sulfa Antibiotics          Current Outpatient Medications:     ascorbic acid (VITAMIN C) 500 mg tablet, Take by mouth, Disp: , Rfl:     b complex vitamins tablet, Take 1 tablet by mouth daily, Disp: , Rfl:     benazepril (LOTENSIN) 20 mg tablet, Take 20 mg by mouth daily, Disp: , Rfl:     cetirizine (ZYRTEC ALLERGY) 10 mg tablet, Take by mouth, Disp: , Rfl:     cholecalciferol (VITAMIN D3) 1,000 units tablet, Take 1,000 Units by mouth daily, Disp: , Rfl:     omega-3-acid ethyl esters (LOVAZA) 1 g capsule, Take 2 g by mouth 2 (two) times a day, Disp: , Rfl:     rosuvastatin (CRESTOR) 5 mg tablet, , Disp: , Rfl:     Review of Systems  Constitutional :no fever, feels well, no tiredness, no recent weight gain or loss  ENT: no ear ache, no loss of hearing, no nosebleeds or nasal discharge, no sore throat or hoarseness  Cardiovascular: no complaints of slow or fast heart beat, no chest pain, no palpitations, no leg claudication or lower extremity edema    Respiratory: no complaints of shortness of shortness of breath, no JONES  Breasts:no complaints of breast pain, breast lump, or nipple discharge  Gastrointestinal: no complaints of abdominal pain, constipation, nausea, vomiting, or diarrhea or bloody stools  Genitourinary : no complaints of dysuria, incontinence, pelvic pain, no dysmenorrhea, vaginal discharge or abnormal vaginal bleeding and as noted in HPI  Musculoskeletal: no complaints of arthralgia, no myalgia, no joint swelling or stiffness, no limb pain or swelling  Integumentary: no complaints of skin rash or lesion, itching or dry skin  Neurological: no complaints of headache, no confusion, no numbness or tingling, no dizziness or fainting    Physical Exam:     /68   Temp (!) 97 3 °F (36 3 °C)   Ht 5' 1 5" (1 562 m)   Wt 56 8 kg (125 lb 4 8 oz)   LMP  (Approximate) Comment: 3/2017  Breastfeeding No   BMI 23 29 kg/m²     General: appears stated age, cooperative, alert normal mood and affect   Psychiatric oriented to person, place and time  Mood and affect normal   Neck: normal, supple,trachea midline, no masses  Thyroid: normal, no thyromegaly   Heart: regular rate and rhythm, S1, S2 normal, no murmur, click, rub or gallop   Lungs: clear to auscultation bilaterally, no increased work of breathing or signs of respiratory distress   Breasts: normal, no dimpling or skin changes noted   Abdomen: soft, non-tender, without masses or organomegaly   Vulva: normal , no lesions   Vagina: normal , no lesions or atrophy   Urethra: normal   Urethal meatus normal   Bladder Normal, soft, non-tender and no prolapse or masses appreciated   Cervix: normal, no palpable masses; ec and c pap and HPV culture done    Uterus: normal , non-tender, not enlarged, no palpable masses   Adnexa: normal, non-tender without fullness or masses;hemoccult neg  Lymphatic Palpation of lymph nodes in neck, axilla, groin and/or other locations: no lymphadenopathy or masses noted   Skin Normal skin turgor and no rashes    Palpation of skin and subcutaneous tissue normal

## 2020-09-28 LAB
CLINICAL INFO: NORMAL
CYTO CVX: NORMAL
CYTOLOGY CMNT CVX/VAG CYTO-IMP: NORMAL
DATE PREVIOUS BX: NORMAL
HPV E6+E7 MRNA CVX QL NAA+PROBE: NOT DETECTED
LMP START DATE: NORMAL
SL AMB PREV. PAP:: NORMAL
SPECIMEN SOURCE CVX/VAG CYTO: NORMAL

## 2020-10-08 NOTE — TELEPHONE ENCOUNTER
Called insurance  They denied the CT stating a CTA of the abdomen and pelvis would be more appropriate  How would you like to proceed?      Ref# [id number]

## 2020-11-12 ENCOUNTER — DOCUMENTATION (OUTPATIENT)
Dept: OBGYN CLINIC | Facility: MEDICAL CENTER | Age: 60
End: 2020-11-12

## 2021-01-26 ENCOUNTER — DOCUMENTATION (OUTPATIENT)
Dept: OBGYN CLINIC | Facility: MEDICAL CENTER | Age: 61
End: 2021-01-26

## 2021-04-26 ENCOUNTER — HOSPITAL ENCOUNTER (OUTPATIENT)
Dept: MAMMOGRAPHY | Facility: MEDICAL CENTER | Age: 61
Discharge: HOME | End: 2021-04-26

## 2021-04-26 VITALS — BODY MASS INDEX: 23 KG/M2 | HEIGHT: 62 IN | WEIGHT: 125 LBS

## 2021-04-26 DIAGNOSIS — Z12.31 ENCOUNTER FOR SCREENING MAMMOGRAM FOR MALIGNANT NEOPLASM OF BREAST: ICD-10-CM

## 2021-04-26 PROCEDURE — 77063 BREAST TOMOSYNTHESIS BI: CPT

## 2021-04-26 PROCEDURE — 77067 SCR MAMMO BI INCL CAD: CPT

## 2021-05-10 ENCOUNTER — DOCUMENTATION (OUTPATIENT)
Dept: OBGYN CLINIC | Facility: MEDICAL CENTER | Age: 61
End: 2021-05-10

## 2022-04-11 ENCOUNTER — TELEPHONE (OUTPATIENT)
Dept: OBGYN CLINIC | Facility: MEDICAL CENTER | Age: 62
End: 2022-04-11

## 2022-04-11 NOTE — TELEPHONE ENCOUNTER
Pt called requesting a script be put in her chart for a mammogram, it is scheduled for 5/11/22  (She was previously pt of Dr Zari Casillas )    Thank you

## 2022-05-11 ENCOUNTER — HOSPITAL ENCOUNTER (OUTPATIENT)
Dept: MAMMOGRAPHY | Facility: MEDICAL CENTER | Age: 62
Discharge: HOME | End: 2022-05-11
Payer: COMMERCIAL

## 2022-05-11 VITALS — WEIGHT: 125 LBS | HEIGHT: 61 IN | BODY MASS INDEX: 23.6 KG/M2

## 2022-05-11 DIAGNOSIS — Z12.31 ENCOUNTER FOR SCREENING MAMMOGRAM FOR MALIGNANT NEOPLASM OF BREAST: ICD-10-CM

## 2022-05-11 PROCEDURE — 77067 SCR MAMMO BI INCL CAD: CPT

## 2022-05-11 PROCEDURE — 77063 BREAST TOMOSYNTHESIS BI: CPT

## 2023-05-24 ENCOUNTER — HOSPITAL ENCOUNTER (OUTPATIENT)
Dept: MAMMOGRAPHY | Facility: MEDICAL CENTER | Age: 63
Discharge: HOME | End: 2023-05-24

## 2023-05-24 VITALS — HEIGHT: 61 IN | BODY MASS INDEX: 23.6 KG/M2 | WEIGHT: 125 LBS

## 2023-05-24 DIAGNOSIS — Z12.31 ENCOUNTER FOR SCREENING MAMMOGRAM FOR MALIGNANT NEOPLASM OF BREAST: ICD-10-CM

## 2024-05-29 ENCOUNTER — HOSPITAL ENCOUNTER (OUTPATIENT)
Dept: MAMMOGRAPHY | Facility: MEDICAL CENTER | Age: 64
Discharge: HOME | End: 2024-05-29
Payer: COMMERCIAL

## 2024-05-29 VITALS — HEIGHT: 61 IN | BODY MASS INDEX: 23.6 KG/M2 | WEIGHT: 125 LBS

## 2024-05-29 DIAGNOSIS — Z12.31 VISIT FOR SCREENING MAMMOGRAM: ICD-10-CM

## 2024-05-29 PROCEDURE — 77067 SCR MAMMO BI INCL CAD: CPT

## 2024-05-29 PROCEDURE — 77063 BREAST TOMOSYNTHESIS BI: CPT

## 2025-06-24 ENCOUNTER — HOSPITAL ENCOUNTER (OUTPATIENT)
Dept: MAMMOGRAPHY | Facility: MEDICAL CENTER | Age: 65
Discharge: HOME | End: 2025-06-24
Attending: STUDENT IN AN ORGANIZED HEALTH CARE EDUCATION/TRAINING PROGRAM
Payer: COMMERCIAL

## 2025-06-24 VITALS — BODY MASS INDEX: 23.6 KG/M2 | HEIGHT: 61 IN | WEIGHT: 125 LBS

## 2025-06-24 DIAGNOSIS — Z12.31 ENCOUNTER FOR SCREENING MAMMOGRAM FOR MALIGNANT NEOPLASM OF BREAST: ICD-10-CM

## 2025-06-24 PROCEDURE — 77063 BREAST TOMOSYNTHESIS BI: CPT

## 2025-06-24 PROCEDURE — 77067 SCR MAMMO BI INCL CAD: CPT
